# Patient Record
Sex: FEMALE | Race: WHITE | Employment: UNEMPLOYED | URBAN - METROPOLITAN AREA
[De-identification: names, ages, dates, MRNs, and addresses within clinical notes are randomized per-mention and may not be internally consistent; named-entity substitution may affect disease eponyms.]

---
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
## 2018-02-02 PROBLEM — R11.2 NAUSEA AND VOMITING: Status: ACTIVE | Noted: 2018-02-02

## 2018-02-02 PROBLEM — O21.9 NAUSEA AND VOMITING IN PREGNANCY PRIOR TO 22 WEEKS GESTATION: Status: ACTIVE | Noted: 2018-02-02

## 2018-04-18 PROBLEM — O47.9 THREATENED LABOR: Status: ACTIVE | Noted: 2018-04-18

## 2018-06-08 PROBLEM — Z37.9 NORMAL LABOR: Status: ACTIVE | Noted: 2018-06-08

## 2018-06-08 PROBLEM — Z34.93 NORMAL PREGNANCY, THIRD TRIMESTER: Status: ACTIVE | Noted: 2018-06-08

## 2020-09-30 ENCOUNTER — OFFICE VISIT (OUTPATIENT)
Dept: FAMILY MEDICINE CLINIC | Age: 20
End: 2020-09-30
Payer: COMMERCIAL

## 2020-09-30 VITALS
HEART RATE: 85 BPM | OXYGEN SATURATION: 98 % | DIASTOLIC BLOOD PRESSURE: 88 MMHG | BODY MASS INDEX: 29.47 KG/M2 | WEIGHT: 187.8 LBS | SYSTOLIC BLOOD PRESSURE: 126 MMHG | HEIGHT: 67 IN | TEMPERATURE: 97.1 F

## 2020-09-30 PROCEDURE — 99204 OFFICE O/P NEW MOD 45 MIN: CPT | Performed by: NURSE PRACTITIONER

## 2020-09-30 ASSESSMENT — ENCOUNTER SYMPTOMS
VOMITING: 0
COUGH: 0
EYE REDNESS: 0
ABDOMINAL PAIN: 0
STRIDOR: 0
DIARRHEA: 0
VOICE CHANGE: 0
CONSTIPATION: 0
EYE DISCHARGE: 0
NAUSEA: 0
SINUS PRESSURE: 0
BLOOD IN STOOL: 0
PHOTOPHOBIA: 0
SINUS PAIN: 0
EYE ITCHING: 0
TROUBLE SWALLOWING: 0
CHOKING: 0
BACK PAIN: 0
SHORTNESS OF BREATH: 0
EYE PAIN: 0
SORE THROAT: 0
WHEEZING: 0
COLOR CHANGE: 0
RHINORRHEA: 0
CHEST TIGHTNESS: 0

## 2020-09-30 ASSESSMENT — PATIENT HEALTH QUESTIONNAIRE - PHQ9
SUM OF ALL RESPONSES TO PHQ9 QUESTIONS 1 & 2: 1
2. FEELING DOWN, DEPRESSED OR HOPELESS: 1
1. LITTLE INTEREST OR PLEASURE IN DOING THINGS: 0
SUM OF ALL RESPONSES TO PHQ QUESTIONS 1-9: 1
SUM OF ALL RESPONSES TO PHQ QUESTIONS 1-9: 1

## 2020-09-30 NOTE — PROGRESS NOTES
stool, constipation, diarrhea, nausea and vomiting. Endocrine: Negative for cold intolerance, heat intolerance, polydipsia and polyuria. Genitourinary: Negative for difficulty urinating, dysuria, enuresis, flank pain, frequency, hematuria and urgency. Musculoskeletal: Negative for back pain, gait problem, joint swelling, neck pain and neck stiffness. Skin: Negative for color change, pallor, rash and wound. Allergic/Immunologic: Negative for environmental allergies and food allergies. Neurological: Positive for headaches. Negative for dizziness, tremors, syncope, speech difficulty, weakness, light-headedness and numbness. Hematological: Negative for adenopathy. Does not bruise/bleed easily. Psychiatric/Behavioral: Negative for agitation, behavioral problems, confusion, decreased concentration, dysphoric mood, hallucinations, self-injury, sleep disturbance and suicidal ideas. The patient is nervous/anxious. The patient is not hyperactive. Prior to Visit Medications    Not on File        No Known Allergies    Past Medical History:   Diagnosis Date    Anxiety 6/15/2017    Tonsillitis     as child    Trauma     dog bite at age 13       History reviewed. No pertinent surgical history.     Social History     Socioeconomic History    Marital status:      Spouse name: Not on file    Number of children: Not on file    Years of education: Not on file    Highest education level: Not on file   Occupational History    Not on file   Social Needs    Financial resource strain: Not on file    Food insecurity     Worry: Not on file     Inability: Not on file    Transportation needs     Medical: Not on file     Non-medical: Not on file   Tobacco Use    Smoking status: Never Smoker    Smokeless tobacco: Never Used   Substance and Sexual Activity    Alcohol use: No     Alcohol/week: 0.0 standard drinks    Drug use: No    Sexual activity: Yes     Partners: Male   Lifestyle    Physical activity oropharyngeal exudate. Eyes:      General:         Right eye: No discharge. Left eye: No discharge. Conjunctiva/sclera: Conjunctivae normal.      Pupils: Pupils are equal, round, and reactive to light. Neck:      Musculoskeletal: Normal range of motion. Thyroid: No thyromegaly. Vascular: No JVD. Trachea: No tracheal deviation. Cardiovascular:      Rate and Rhythm: Normal rate and regular rhythm. Heart sounds: Normal heart sounds. No murmur. No friction rub. Pulmonary:      Effort: Pulmonary effort is normal. No respiratory distress. Breath sounds: Normal breath sounds. No stridor. No decreased breath sounds, wheezing, rhonchi or rales. Musculoskeletal: Normal range of motion. General: No tenderness. Lymphadenopathy:      Cervical: No cervical adenopathy. Skin:     General: Skin is warm and dry. Capillary Refill: Capillary refill takes less than 2 seconds. Findings: No rash. Neurological:      Mental Status: She is alert and oriented to person, place, and time. Sensory: Sensation is intact. Motor: Motor function is intact. Coordination: Coordination normal.   Psychiatric:         Attention and Perception: Attention and perception normal.         Mood and Affect: Mood normal.         Speech: Speech normal.         Behavior: Behavior normal. Behavior is cooperative. Thought Content: Thought content normal.         Cognition and Memory: Cognition normal.         Judgment: Judgment normal.         ASSESSMENT/PLAN:  1. Establishing care with new doctor, encounter for  -Will review OB labs    2. Anxiety  -Stable at this time    3. Acute nonintractable headache, unspecified headache type  -Will call if this occurs again and will consider ref to neurology. Follow up if symptoms do not improve or worsen. If the patient becomes short of breath go straight to the ER or call 911.       Return in about 1 year (around 9/30/2021). An  electronic signature was used to authenticate this note.     --Christopher Alvarenga, APRN - CNP on 9/30/2020 at 10:47 AM

## 2022-10-04 ENCOUNTER — OFFICE VISIT (OUTPATIENT)
Dept: FAMILY MEDICINE CLINIC | Age: 22
End: 2022-10-04
Payer: COMMERCIAL

## 2022-10-04 VITALS
WEIGHT: 177.8 LBS | SYSTOLIC BLOOD PRESSURE: 122 MMHG | BODY MASS INDEX: 27.91 KG/M2 | OXYGEN SATURATION: 98 % | DIASTOLIC BLOOD PRESSURE: 94 MMHG | HEART RATE: 94 BPM | HEIGHT: 67 IN

## 2022-10-04 DIAGNOSIS — J03.90 TONSILLITIS: Primary | ICD-10-CM

## 2022-10-04 PROCEDURE — 99213 OFFICE O/P EST LOW 20 MIN: CPT | Performed by: FAMILY MEDICINE

## 2022-10-04 RX ORDER — FLUTICASONE PROPIONATE 50 MCG
2 SPRAY, SUSPENSION (ML) NASAL DAILY
Qty: 16 G | Refills: 3 | Status: SHIPPED | OUTPATIENT
Start: 2022-10-04

## 2022-10-04 RX ORDER — AMOXICILLIN AND CLAVULANATE POTASSIUM 875; 125 MG/1; MG/1
1 TABLET, FILM COATED ORAL 2 TIMES DAILY
Qty: 20 TABLET | Refills: 0 | Status: SHIPPED | OUTPATIENT
Start: 2022-10-04 | End: 2022-10-14

## 2022-10-04 RX ORDER — AMOXICILLIN AND CLAVULANATE POTASSIUM 875; 125 MG/1; MG/1
1 TABLET, FILM COATED ORAL 2 TIMES DAILY
Qty: 10 TABLET | Refills: 0 | Status: SHIPPED | OUTPATIENT
Start: 2022-10-04 | End: 2022-10-04

## 2022-10-04 RX ORDER — PREDNISONE 20 MG/1
40 TABLET ORAL DAILY
Qty: 10 TABLET | Refills: 0 | Status: SHIPPED | OUTPATIENT
Start: 2022-10-04 | End: 2022-10-09

## 2022-10-04 ASSESSMENT — ENCOUNTER SYMPTOMS
COUGH: 1
SORE THROAT: 1
RHINORRHEA: 1

## 2022-10-04 ASSESSMENT — PATIENT HEALTH QUESTIONNAIRE - PHQ9
9. THOUGHTS THAT YOU WOULD BE BETTER OFF DEAD, OR OF HURTING YOURSELF: 0
4. FEELING TIRED OR HAVING LITTLE ENERGY: 0
5. POOR APPETITE OR OVEREATING: 0
SUM OF ALL RESPONSES TO PHQ QUESTIONS 1-9: 0
10. IF YOU CHECKED OFF ANY PROBLEMS, HOW DIFFICULT HAVE THESE PROBLEMS MADE IT FOR YOU TO DO YOUR WORK, TAKE CARE OF THINGS AT HOME, OR GET ALONG WITH OTHER PEOPLE: 0
8. MOVING OR SPEAKING SO SLOWLY THAT OTHER PEOPLE COULD HAVE NOTICED. OR THE OPPOSITE, BEING SO FIGETY OR RESTLESS THAT YOU HAVE BEEN MOVING AROUND A LOT MORE THAN USUAL: 0
2. FEELING DOWN, DEPRESSED OR HOPELESS: 0
6. FEELING BAD ABOUT YOURSELF - OR THAT YOU ARE A FAILURE OR HAVE LET YOURSELF OR YOUR FAMILY DOWN: 0
SUM OF ALL RESPONSES TO PHQ QUESTIONS 1-9: 0
3. TROUBLE FALLING OR STAYING ASLEEP: 0
SUM OF ALL RESPONSES TO PHQ9 QUESTIONS 1 & 2: 0
7. TROUBLE CONCENTRATING ON THINGS, SUCH AS READING THE NEWSPAPER OR WATCHING TELEVISION: 0
SUM OF ALL RESPONSES TO PHQ QUESTIONS 1-9: 0
1. LITTLE INTEREST OR PLEASURE IN DOING THINGS: 0
SUM OF ALL RESPONSES TO PHQ QUESTIONS 1-9: 0

## 2022-10-04 NOTE — PROGRESS NOTES
Chief Complaint   Patient presents with    Nasal Congestion    Pharyngitis       HPI:  Rohan Gonzalez is a 23 y.o. (: 2000) here today   for nasal congestion and sore throat. URI   This is a new problem. The current episode started in the past 7 days (6 days or so). The problem has been gradually improving. There has been no fever. Associated symptoms include congestion (nasal), coughing (dry), rhinorrhea and a sore throat. She has tried antihistamine and NSAIDs for the symptoms. The treatment provided moderate (has helped headache.) relief. Seems worse w/ colder weather. Had covid in July. Runny nose since covid. Has taken otc allergy meds. Daughter w/ recent uri sxs. Can feel some mucus in throat. Patient's medications, allergies, past medical, surgical, social and family histories were reviewed and updated as appropriate. ROS:  Review of Systems   Constitutional:  Negative for fever. HENT:  Positive for congestion (nasal), rhinorrhea and sore throat. Respiratory:  Positive for cough (dry). Prior to Visit Medications    Medication Sig Taking? Authorizing Provider   predniSONE (DELTASONE) 20 MG tablet Take 2 tablets by mouth daily for 5 days Yes Chilo Chapman MD   fluticasone Sandra Ramón) 50 MCG/ACT nasal spray 2 sprays by Nasal route daily Yes Chilo Chapman MD   amoxicillin-clavulanate (AUGMENTIN) 875-125 MG per tablet Take 1 tablet by mouth 2 times daily for 10 days Yes Chilo Chapman MD       No Known Allergies    OBJECTIVE:    BP (!) 122/94   Pulse 94   Ht 5' 7\" (1.702 m)   Wt 177 lb 12.8 oz (80.6 kg)   SpO2 98%   BMI 27.85 kg/m²     BP Readings from Last 2 Encounters:   10/04/22 (!) 122/94   20 126/88       Wt Readings from Last 3 Encounters:   10/04/22 177 lb 12.8 oz (80.6 kg)   20 187 lb 12.8 oz (85.2 kg)   18 170 lb (77.1 kg) (92 %, Z= 1.38)*     * Growth percentiles are based on CDC (Girls, 2-20 Years) data.        Physical Exam  Constitutional:       Appearance: She is well-developed. HENT:      Head: Normocephalic and atraumatic. Right Ear: Tympanic membrane and external ear normal.      Left Ear: Tympanic membrane and external ear normal.      Nose:      Right Sinus: No maxillary sinus tenderness or frontal sinus tenderness. Left Sinus: No maxillary sinus tenderness or frontal sinus tenderness. Mouth/Throat: Tonsils: Tonsillar exudate present. 3+ on the right. 2+ on the left. Eyes:      Conjunctiva/sclera: Conjunctivae normal.   Neck:      Trachea: No tracheal deviation. Cardiovascular:      Rate and Rhythm: Normal rate and regular rhythm. Heart sounds: Normal heart sounds. Pulmonary:      Effort: Pulmonary effort is normal.      Breath sounds: Normal breath sounds. Abdominal:      Palpations: Abdomen is soft. Tenderness: There is no abdominal tenderness. Skin:     General: Skin is warm and dry. Neurological:      Mental Status: She is alert and oriented to person, place, and time. Psychiatric:         Mood and Affect: Mood normal.         Behavior: Behavior normal.         ASSESSMENT/PLAN:     1. Tonsillitis  Sig swelling and exudate to bilat tonsils. Meds as below. If fails to resolve, rec ent referral given appearance. Hx of recurrent tonsillitis. Also discussed flonase and humidifier.    - predniSONE (DELTASONE) 20 MG tablet; Take 2 tablets by mouth daily for 5 days  Dispense: 10 tablet; Refill: 0  - fluticasone (FLONASE) 50 MCG/ACT nasal spray; 2 sprays by Nasal route daily  Dispense: 16 g; Refill: 3  - amoxicillin-clavulanate (AUGMENTIN) 875-125 MG per tablet; Take 1 tablet by mouth 2 times daily for 10 days  Dispense: 20 tablet;  Refill: 0

## 2022-12-25 ENCOUNTER — HOSPITAL ENCOUNTER (EMERGENCY)
Age: 22
Discharge: HOME | End: 2022-12-25
Attending: EMERGENCY MEDICINE
Payer: COMMERCIAL

## 2022-12-25 VITALS
TEMPERATURE: 97.6 F | SYSTOLIC BLOOD PRESSURE: 130 MMHG | RESPIRATION RATE: 16 BRPM | HEIGHT: 67 IN | OXYGEN SATURATION: 100 % | DIASTOLIC BLOOD PRESSURE: 96 MMHG | WEIGHT: 180 LBS | HEART RATE: 71 BPM | BODY MASS INDEX: 28.25 KG/M2

## 2022-12-25 DIAGNOSIS — R82.90 ABNORMAL URINALYSIS: ICD-10-CM

## 2022-12-25 DIAGNOSIS — R55 PRE-SYNCOPE: Primary | ICD-10-CM

## 2022-12-25 LAB
ANION GAP SERPL CALCULATED.3IONS-SCNC: 12 MMOL/L (ref 3–16)
BACTERIA: ABNORMAL /HPF
BASOPHILS ABSOLUTE: 0.1 K/UL (ref 0–0.2)
BASOPHILS RELATIVE PERCENT: 0.4 %
BILIRUBIN URINE: NEGATIVE
BLOOD, URINE: ABNORMAL
BUN BLDV-MCNC: 12 MG/DL (ref 7–20)
CALCIUM SERPL-MCNC: 9.6 MG/DL (ref 8.3–10.6)
CHLORIDE BLD-SCNC: 99 MMOL/L (ref 99–110)
CLARITY: ABNORMAL
CO2: 25 MMOL/L (ref 21–32)
COLOR: ABNORMAL
CREAT SERPL-MCNC: 0.8 MG/DL (ref 0.6–1.1)
EOSINOPHILS ABSOLUTE: 0.1 K/UL (ref 0–0.6)
EOSINOPHILS RELATIVE PERCENT: 0.6 %
EPITHELIAL CELLS, UA: ABNORMAL /HPF (ref 0–5)
GFR SERPL CREATININE-BSD FRML MDRD: >60 ML/MIN/{1.73_M2}
GLUCOSE BLD-MCNC: 109 MG/DL (ref 70–99)
GLUCOSE BLD-MCNC: 116 MG/DL (ref 70–99)
GLUCOSE URINE: NEGATIVE MG/DL
HCG(URINE) PREGNANCY TEST: NEGATIVE
HCT VFR BLD CALC: 41 % (ref 36–48)
HEMOGLOBIN: 13.8 G/DL (ref 12–16)
KETONES, URINE: NEGATIVE MG/DL
LEUKOCYTE ESTERASE, URINE: ABNORMAL
LYMPHOCYTES ABSOLUTE: 2 K/UL (ref 1–5.1)
LYMPHOCYTES RELATIVE PERCENT: 14.8 %
MCH RBC QN AUTO: 27.1 PG (ref 26–34)
MCHC RBC AUTO-ENTMCNC: 33.6 G/DL (ref 31–36)
MCV RBC AUTO: 80.7 FL (ref 80–100)
MICROSCOPIC EXAMINATION: YES
MONOCYTES ABSOLUTE: 1 K/UL (ref 0–1.3)
MONOCYTES RELATIVE PERCENT: 7.2 %
NEUTROPHILS ABSOLUTE: 10.4 K/UL (ref 1.7–7.7)
NEUTROPHILS RELATIVE PERCENT: 77 %
NITRITE, URINE: NEGATIVE
PDW BLD-RTO: 13.6 % (ref 12.4–15.4)
PERFORMED ON: ABNORMAL
PH UA: 6.5 (ref 5–8)
PLATELET # BLD: 363 K/UL (ref 135–450)
PMV BLD AUTO: 7.7 FL (ref 5–10.5)
POTASSIUM SERPL-SCNC: 3.4 MMOL/L (ref 3.5–5.1)
PROTEIN UA: NEGATIVE MG/DL
RBC # BLD: 5.08 M/UL (ref 4–5.2)
RBC UA: ABNORMAL /HPF (ref 0–4)
SODIUM BLD-SCNC: 136 MMOL/L (ref 136–145)
SPECIFIC GRAVITY UA: 1.01 (ref 1–1.03)
URINE REFLEX TO CULTURE: YES
URINE TYPE: ABNORMAL
UROBILINOGEN, URINE: 0.2 E.U./DL
WBC # BLD: 13.5 K/UL (ref 4–11)
WBC UA: ABNORMAL /HPF (ref 0–5)

## 2022-12-25 PROCEDURE — 93005 ELECTROCARDIOGRAM TRACING: CPT | Performed by: EMERGENCY MEDICINE

## 2022-12-25 PROCEDURE — 99284 EMERGENCY DEPT VISIT MOD MDM: CPT

## 2022-12-25 PROCEDURE — 81001 URINALYSIS AUTO W/SCOPE: CPT

## 2022-12-25 PROCEDURE — 87086 URINE CULTURE/COLONY COUNT: CPT

## 2022-12-25 PROCEDURE — 80048 BASIC METABOLIC PNL TOTAL CA: CPT

## 2022-12-25 PROCEDURE — 85025 COMPLETE CBC W/AUTO DIFF WBC: CPT

## 2022-12-25 PROCEDURE — 84703 CHORIONIC GONADOTROPIN ASSAY: CPT

## 2022-12-25 RX ORDER — CEFUROXIME AXETIL 250 MG/1
250 TABLET ORAL 2 TIMES DAILY
Qty: 14 TABLET | Refills: 0 | Status: SHIPPED | OUTPATIENT
Start: 2022-12-25 | End: 2023-01-01

## 2022-12-25 RX ORDER — CARBAMAZEPINE 100 MG/5ML
8 SUSPENSION ORAL ONCE
Status: DISCONTINUED | OUTPATIENT
Start: 2022-12-25 | End: 2022-12-25

## 2022-12-25 RX ORDER — NITROFURANTOIN 25; 75 MG/1; MG/1
100 CAPSULE ORAL 2 TIMES DAILY
Qty: 10 CAPSULE | Refills: 0 | Status: SHIPPED
Start: 2022-12-25 | End: 2022-12-25 | Stop reason: HOSPADM

## 2022-12-25 ASSESSMENT — PAIN - FUNCTIONAL ASSESSMENT: PAIN_FUNCTIONAL_ASSESSMENT: NONE - DENIES PAIN

## 2022-12-25 NOTE — ED PROVIDER NOTES
[address] [facility]  ED  eMERGENCY dEPARTMENT eNCOUnter      Pt Name: Cain Reyez  MRN: [id number]  Armsyggfrina 2000  Date of evaluation: 12/25/2022  Provider: Roxie Morejon MD    CHIEF COMPLAINT       Chief Complaint   Patient presents with    Dizziness    Fatigue     Sx started 1230         HISTORY OF PRESENT ILLNESS   (Location/Symptom, Timing/Onset,Context/Setting, Quality, Duration, Modifying Factors, Severity)  Note limiting factors. Cani Reyez is a 23 y.o. female who presents to the emergency department complaining of some presyncopal symptoms. Patient states she came to work today and sometime around noon she started feeling a little lightheaded. She states she had some associated tingling in both of her fingertips. She denied any headache or confusion. No loss of bowel or bladder tone. No lapses in memory. No palpitations. No weakness of her extremities. No seizure activity. No altered mental status. Her coworkers asked her if she was okay and she told them that she was not feeling well and they brought her to the emergency department for further evaluation. I spoke to one of the nurses who brought the patient downstairs and they deny any altered mental status and the patient. HPI    NursingNotes were reviewed. REVIEW OF SYSTEMS    (2-9 systems for level 4, 10 or more for level 5)     Review of Systems    Except as noted above the remainder of the review of systems was reviewed and negative. PAST MEDICAL HISTORY     Past Medical History:   Diagnosis Date    Anxiety 6/15/2017    Tonsillitis     as child    Trauma     dog bite at age 13         SURGICALHISTORY     History reviewed. No pertinent surgical history. CURRENT MEDICATIONS       Previous Medications    FLUTICASONE (FLONASE) 50 MCG/ACT NASAL SPRAY    2 sprays by Nasal route daily       ALLERGIES     Patient has no known allergies.     FAMILY HISTORY       Family History   Problem Relation Age of Onset    Asthma Mother     Thyroid Disease Mother     High Blood Pressure Father     High Cholesterol Father     Breast Cancer Maternal Grandmother     High Blood Pressure Maternal Grandfather     High Blood Pressure Paternal Grandfather     Diabetes Paternal Grandfather     Breast Cancer Paternal Grandmother           SOCIAL HISTORY       Social History     Socioeconomic History    Marital status: Legally      Spouse name: None    Number of children: None    Years of education: None    Highest education level: None   Tobacco Use    Smoking status: Never    Smokeless tobacco: Never   Vaping Use    Vaping Use: Never used   Substance and Sexual Activity    Alcohol use: Yes     Comment: social    Drug use: No    Sexual activity: Yes     Partners: Male       SCREENINGS             PHYSICAL EXAM    (up to 7 for level 4, 8 or more for level 5)     ED Triage Vitals   BP Temp Temp src Pulse Resp SpO2 Height Weight   -- -- -- -- -- -- -- --       Physical Exam    DIAGNOSTIC RESULTS     EKG: All EKG's are interpreted by the Emergency Department Physician who either signs or Co-signsthis chart in the absence of a cardiologist.    The Ekg interpreted by me shows  normal sinus rhythm with a rate of 90  Axis is   Normal  QTc is  normal  Intervals and Durations are unremarkable.       ST Segments: nonspecific changes  No prior EKG available for comparison        RADIOLOGY:   Non-plain filmimages such as CT, Ultrasound and MRI are read by the radiologist. Plain radiographic images are visualized and preliminarily interpreted by the emergency physician with the below findings:        Interpretation per the Radiologist below, if available at the time ofthis note:    No orders to display         ED BEDSIDE ULTRASOUND:   Performed by ED Physician - none    LABS:  Labs Reviewed   CBC WITH AUTO DIFFERENTIAL - Abnormal; Notable for the following components:       Result Value    WBC 13.5 (*)     Neutrophils Absolute 10.4 (*) All other components within normal limits   BASIC METABOLIC PANEL - Abnormal; Notable for the following components:    Potassium 3.4 (*)     Glucose 109 (*)     All other components within normal limits   URINALYSIS WITH REFLEX TO CULTURE - Abnormal; Notable for the following components:    Clarity, UA CLOUDY (*)     Blood, Urine LARGE (*)     Leukocyte Esterase, Urine MODERATE (*)     All other components within normal limits   MICROSCOPIC URINALYSIS - Abnormal; Notable for the following components:    WBC, UA 21-50 (*)     RBC, UA 5-10 (*)     Epithelial Cells, UA 21-50 (*)     Bacteria, UA 3+ (*)     All other components within normal limits   POCT GLUCOSE - Abnormal; Notable for the following components:    POC Glucose 116 (*)     All other components within normal limits   CULTURE, URINE   PREGNANCY, URINE   PROLACTIN       All other labs were within normal range or not returned as of this dictation. EMERGENCY DEPARTMENT COURSE and DIFFERENTIAL DIAGNOSIS/MDM:   Vitals:    Vitals:    12/25/22 1638 12/25/22 1715   BP: (!) 150/107 (!) 137/106   Pulse: 93    Resp: 18    Temp: 97.6 °F (36.4 °C)    TempSrc: Oral    SpO2: 100% 99%   Weight: 180 lb (81.6 kg)    Height: 5' 7\" (1.702 m)            MDM    Frontal diagnosis includes anemia, dehydration, UTI, pregnancy, other    At this time I have a very low suspicion of seizure for this patient nevertheless I did send a prolactin and if that was to come back abnormal would recommend that she gets referral by neurology. Her work-up today showed a potassium of 3.4 and a mild elevation of her white blood cell count at 13.5. The patient's urinalysis was abnormal with moderate leukocyte Estrace and 21-50 white blood cells. She also though had 21-50 epithelial cells but 3+ bacteriuria. I had a long conversation with the patient. She remains afebrile here and is not tachycardic or hypotensive.   I believe the patient could have an early UTI which might explain why she has not been feeling well it is also possible that she has a contaminated specimen. After talking about the risk and benefits of treating the patient for UTI versus awaiting the culture the patient elects to be treated for UTI. Return precautions were discussed. Is this patient to be included in the SEP-1 Core Measure? No   Exclusion criteria - the patient is NOT to be included for SEP-1 Core Measure due to:  May have criteria for sepsis, but does not meet criteria for severe sepsis or septic shock     CRITICAL CARE TIME   I personally saw the patient and independently provided 0 minutes of non-concurrent critical care out of the total shared critical care time provided. There was a high probability of clinically significant/life threatening deterioration in the patient's condition which required my urgent intervention. CONSULTS:  None    PROCEDURES:  Unless otherwise noted below, none     Procedures    FINAL IMPRESSION      1. Pre-syncope    2.  Abnormal urinalysis          DISPOSITION/PLAN   DISPOSITION Decision To Discharge 12/25/2022 07:18:48 PM      PATIENT REFERRED TO:  Jess Cummins MD  [id number] [facility] [address] [facility]  [phone]    Schedule an appointment as soon as possible for a visit in 3 days      DISCHARGE MEDICATIONS:  New Prescriptions    CEFUROXIME (CEFTIN) 250 MG TABLET    Take 1 tablet by mouth 2 times daily for 7 days          (Please note that portions of this note were completed with a voice recognition program.Efforts were made to edit the dictations but occasionally words are mis-transcribed.)    Khadra Wheeler MD (electronically signed)  Attending Emergency Physician          Khadra Wheeler MD  12/25/22 9224

## 2022-12-25 NOTE — ED NOTES
No procal result in chart. RN notified lab, who states they will run now.       Gabby Valentine RN  12/25/22 1916

## 2022-12-25 NOTE — ED NOTES
Pt ambulated with steady gait to bathroom. Urine sample collected and sent to lab.       Deacon Guadalupe RN  12/25/22 3908

## 2022-12-26 LAB
EKG ATRIAL RATE: 90 BPM
EKG DIAGNOSIS: NORMAL
EKG P AXIS: 51 DEGREES
EKG P-R INTERVAL: 128 MS
EKG Q-T INTERVAL: 358 MS
EKG QRS DURATION: 88 MS
EKG QTC CALCULATION (BAZETT): 437 MS
EKG R AXIS: 40 DEGREES
EKG T AXIS: 33 DEGREES
EKG VENTRICULAR RATE: 90 BPM
REASON FOR REJECTION: NORMAL
REJECTED TEST: NORMAL

## 2022-12-26 PROCEDURE — 93010 ELECTROCARDIOGRAM REPORT: CPT | Performed by: INTERNAL MEDICINE

## 2022-12-27 LAB — URINE CULTURE, ROUTINE: NORMAL

## 2023-01-09 ENCOUNTER — TELEMEDICINE (OUTPATIENT)
Dept: FAMILY MEDICINE CLINIC | Age: 23
End: 2023-01-09
Payer: COMMERCIAL

## 2023-01-09 DIAGNOSIS — R53.83 OTHER FATIGUE: Primary | ICD-10-CM

## 2023-01-09 DIAGNOSIS — R03.0 ELEVATED BLOOD PRESSURE READING: ICD-10-CM

## 2023-01-09 DIAGNOSIS — Z13.220 SCREENING, LIPID: ICD-10-CM

## 2023-01-09 DIAGNOSIS — F41.9 ANXIETY: ICD-10-CM

## 2023-01-09 PROBLEM — Z34.93 NORMAL PREGNANCY, THIRD TRIMESTER: Status: RESOLVED | Noted: 2018-06-08 | Resolved: 2023-01-09

## 2023-01-09 PROCEDURE — 99213 OFFICE O/P EST LOW 20 MIN: CPT | Performed by: FAMILY MEDICINE

## 2023-01-09 RX ORDER — CEFUROXIME AXETIL 250 MG/1
250 TABLET ORAL 2 TIMES DAILY
COMMUNITY

## 2023-01-09 ASSESSMENT — PATIENT HEALTH QUESTIONNAIRE - PHQ9
9. THOUGHTS THAT YOU WOULD BE BETTER OFF DEAD, OR OF HURTING YOURSELF: 0
8. MOVING OR SPEAKING SO SLOWLY THAT OTHER PEOPLE COULD HAVE NOTICED. OR THE OPPOSITE, BEING SO FIGETY OR RESTLESS THAT YOU HAVE BEEN MOVING AROUND A LOT MORE THAN USUAL: 0
SUM OF ALL RESPONSES TO PHQ QUESTIONS 1-9: 0
5. POOR APPETITE OR OVEREATING: 0
SUM OF ALL RESPONSES TO PHQ9 QUESTIONS 1 & 2: 0
SUM OF ALL RESPONSES TO PHQ QUESTIONS 1-9: 0
SUM OF ALL RESPONSES TO PHQ QUESTIONS 1-9: 0
7. TROUBLE CONCENTRATING ON THINGS, SUCH AS READING THE NEWSPAPER OR WATCHING TELEVISION: 0
2. FEELING DOWN, DEPRESSED OR HOPELESS: 0
SUM OF ALL RESPONSES TO PHQ QUESTIONS 1-9: 0
6. FEELING BAD ABOUT YOURSELF - OR THAT YOU ARE A FAILURE OR HAVE LET YOURSELF OR YOUR FAMILY DOWN: 0
10. IF YOU CHECKED OFF ANY PROBLEMS, HOW DIFFICULT HAVE THESE PROBLEMS MADE IT FOR YOU TO DO YOUR WORK, TAKE CARE OF THINGS AT HOME, OR GET ALONG WITH OTHER PEOPLE: 0
4. FEELING TIRED OR HAVING LITTLE ENERGY: 0
1. LITTLE INTEREST OR PLEASURE IN DOING THINGS: 0
3. TROUBLE FALLING OR STAYING ASLEEP: 0

## 2023-01-09 ASSESSMENT — ENCOUNTER SYMPTOMS: SHORTNESS OF BREATH: 0

## 2023-01-09 NOTE — PROGRESS NOTES
2023    TELEHEALTH EVALUATION -- Audio/Visual (During HSYIC-66 public health emergency)    HPI:    Clemencia Young (:  2000) has requested an audio/video evaluation for the following concern(s):    Was on ER on ana. Was at work. Was feeling \"off. \"  Was lightheaded. Bp was elevated. Checked by nurse at work. Taken to ER. ER concerned re anxiety. Did not really feel anxiety related. Felt \"fuzziness\" and lightheadedness at that time. Abn ua. Placed on abx. Overall feeling better. Still sig fatigue. Has not checked bp recently. No further lightheadedness. Review of Systems   Constitutional:  Positive for fatigue. Negative for fever. Respiratory:  Negative for shortness of breath. Neurological:  Negative for dizziness and light-headedness. Prior to Visit Medications    Medication Sig Taking?  Authorizing Provider   cefUROXime (CEFTIN) 250 MG tablet Take 250 mg by mouth 2 times daily Yes Historical Provider, MD       Social History     Tobacco Use    Smoking status: Never    Smokeless tobacco: Never   Vaping Use    Vaping Use: Never used   Substance Use Topics    Alcohol use: Yes     Comment: social    Drug use: No        No Known Allergies,   Past Medical History:   Diagnosis Date    Anxiety 6/15/2017    Tonsillitis     as child    Trauma     dog bite at age 13       PHYSICAL EXAMINATION:  [ INSTRUCTIONS:  \"[x]\" Indicates a positive item  \"[]\" Indicates a negative item  -- DELETE ALL ITEMS NOT EXAMINED]  Vital Signs: (As obtained by patient/caregiver or practitioner observation)    Blood pressure-  Heart rate-    Respiratory rate-    Temperature-  Pulse oximetry-     Constitutional: [x] Appears well-developed and well-nourished [] No apparent distress      [] Abnormal-   Mental status  [x] Alert and awake  [x] Oriented to person/place/time []Able to follow commands      Eyes:  EOM    [x]  Normal  [] Abnormal-  Sclera  []  Normal  [] Abnormal -         Discharge []  None visible  [] Abnormal -    HENT:   [x] Normocephalic, atraumatic. [] Abnormal   [] Mouth/Throat: Mucous membranes are moist.     External Ears [] Normal  [] Abnormal-     Neck: [] No visualized mass     Pulmonary/Chest: [x] Respiratory effort normal.  [] No visualized signs of difficulty breathing or respiratory distress        [] Abnormal-      Musculoskeletal:   [] Normal gait with no signs of ataxia         [] Normal range of motion of neck        [] Abnormal-       Neurological:        [x] No Facial Asymmetry (Cranial nerve 7 motor function) (limited exam to video visit)          [] No gaze palsy        [] Abnormal-         Skin:        [x] No significant exanthematous lesions or discoloration noted on facial skin         [] Abnormal-            Psychiatric:       [x] Normal Affect [] No Hallucinations        [] Abnormal-     Other pertinent observable physical exam findings-     Due to this being a TeleHealth encounter, evaluation of the following organ systems is limited: Vitals/Constitutional/EENT/Resp/CV/GI//MS/Neuro/Skin/Heme-Lymph-Imm. ASSESSMENT/PLAN:  1. Other fatigue  Ongoing issues with fatigue. Differential diagnosis is large, but significant family history of thyroid issues. She does have a -aged child which may be contributing as well. She denies significant issues with anxiety at this time, but acknowledges that may be contributing at least somewhat as well. Labs as below. Call if fails to improve. Lightheadedness is improved. I reviewed her ER note as well. Treated with antibiotics for possible urinary tract infection  - Comprehensive Metabolic Panel; Future  - TSH; Future  - T4, Free; Future  - CBC with Auto Differential; Future  - Vitamin B12; Future    2. Anxiety  Concern that there may have been an anxiety component to her recent ER visit. Patient denies significant anxiety currently. Monitor for now.     3. Elevated blood pressure reading  Blood pressure elevated in emergency department. She has not really had any repeat blood pressure checks since. Diastolic blood pressure mildly elevated last time she was here. Repeat blood pressure at work and at home. Call if remains elevated    4. Screening, lipid  Labs as below with next draw  - Lipid Panel; Future    No follow-ups on file. An  electronic signature was used to authenticate this note. --Tayo Whyte MD on 1/9/2023 at 1:02 PM    This document was prepared by a combination of typing and transcription through a voice recognition software. Pursuant to the emergency declaration under the [facility] waiver authority and the [employer] and [employer] Act, this Virtual  Visit was conducted, with patient's consent, to reduce the patient's risk of exposure to COVID-19 and provide continuity of care for an established patient. Services were provided through a video synchronous discussion virtually to substitute for in-person clinic visit.

## 2023-01-14 ASSESSMENT — ENCOUNTER SYMPTOMS
SHORTNESS OF BREATH: 0
ABDOMINAL PAIN: 0
EYE REDNESS: 0
RHINORRHEA: 0
SORE THROAT: 0

## 2023-02-02 ENCOUNTER — HOSPITAL ENCOUNTER (OUTPATIENT)
Age: 23
Discharge: HOME | End: 2023-02-02
Payer: COMMERCIAL

## 2023-02-02 DIAGNOSIS — R53.83 OTHER FATIGUE: ICD-10-CM

## 2023-02-02 DIAGNOSIS — Z13.220 SCREENING, LIPID: ICD-10-CM

## 2023-02-02 LAB
A/G RATIO: 1.4 (ref 1.1–2.2)
ALBUMIN SERPL-MCNC: 4.6 G/DL (ref 3.4–5)
ALP BLD-CCNC: 75 U/L (ref 40–129)
ALT SERPL-CCNC: 7 U/L (ref 10–40)
ANION GAP SERPL CALCULATED.3IONS-SCNC: 10 MMOL/L (ref 3–16)
AST SERPL-CCNC: 15 U/L (ref 15–37)
BASOPHILS ABSOLUTE: 0 K/UL (ref 0–0.2)
BASOPHILS RELATIVE PERCENT: 0.7 %
BILIRUB SERPL-MCNC: 0.5 MG/DL (ref 0–1)
BUN BLDV-MCNC: 15 MG/DL (ref 7–20)
CALCIUM SERPL-MCNC: 9.9 MG/DL (ref 8.3–10.6)
CHLORIDE BLD-SCNC: 102 MMOL/L (ref 99–110)
CHOLESTEROL, TOTAL: 183 MG/DL (ref 0–199)
CO2: 28 MMOL/L (ref 21–32)
CREAT SERPL-MCNC: 0.8 MG/DL (ref 0.6–1.1)
EOSINOPHILS ABSOLUTE: 0.1 K/UL (ref 0–0.6)
EOSINOPHILS RELATIVE PERCENT: 1.8 %
GFR SERPL CREATININE-BSD FRML MDRD: >60 ML/MIN/{1.73_M2}
GLUCOSE BLD-MCNC: 93 MG/DL (ref 70–99)
HCT VFR BLD CALC: 40.9 % (ref 36–48)
HDLC SERPL-MCNC: 49 MG/DL (ref 40–60)
HEMOGLOBIN: 13.6 G/DL (ref 12–16)
LDL CHOLESTEROL CALCULATED: 112 MG/DL
LYMPHOCYTES ABSOLUTE: 2.5 K/UL (ref 1–5.1)
LYMPHOCYTES RELATIVE PERCENT: 35.5 %
MCH RBC QN AUTO: 26.4 PG (ref 26–34)
MCHC RBC AUTO-ENTMCNC: 33.3 G/DL (ref 31–36)
MCV RBC AUTO: 79.3 FL (ref 80–100)
MONOCYTES ABSOLUTE: 0.6 K/UL (ref 0–1.3)
MONOCYTES RELATIVE PERCENT: 9.3 %
NEUTROPHILS ABSOLUTE: 3.7 K/UL (ref 1.7–7.7)
NEUTROPHILS RELATIVE PERCENT: 52.7 %
PDW BLD-RTO: 14 % (ref 12.4–15.4)
PLATELET # BLD: 303 K/UL (ref 135–450)
PMV BLD AUTO: 8 FL (ref 5–10.5)
POTASSIUM SERPL-SCNC: 4 MMOL/L (ref 3.5–5.1)
RBC # BLD: 5.15 M/UL (ref 4–5.2)
SODIUM BLD-SCNC: 140 MMOL/L (ref 136–145)
T4 FREE: 1.2 NG/DL (ref 0.9–1.8)
TOTAL PROTEIN: 7.9 G/DL (ref 6.4–8.2)
TRIGL SERPL-MCNC: 110 MG/DL (ref 0–150)
TSH SERPL DL<=0.05 MIU/L-ACNC: 1.1 UIU/ML (ref 0.27–4.2)
VITAMIN B-12: 301 PG/ML (ref 211–911)
VLDLC SERPL CALC-MCNC: 22 MG/DL
WBC # BLD: 6.9 K/UL (ref 4–11)

## 2023-02-02 PROCEDURE — 84443 ASSAY THYROID STIM HORMONE: CPT

## 2023-02-02 PROCEDURE — 82607 VITAMIN B-12: CPT

## 2023-02-02 PROCEDURE — 36415 COLL VENOUS BLD VENIPUNCTURE: CPT

## 2023-02-02 PROCEDURE — 80061 LIPID PANEL: CPT

## 2023-02-02 PROCEDURE — 80053 COMPREHEN METABOLIC PANEL: CPT

## 2023-02-02 PROCEDURE — 84439 ASSAY OF FREE THYROXINE: CPT

## 2023-02-02 PROCEDURE — 85025 COMPLETE CBC W/AUTO DIFF WBC: CPT

## 2023-02-02 NOTE — RESULT ENCOUNTER NOTE
Thyroid labs normal.  Does not explain fatigue. Still waiting a B12 level.   If it is normal, and fatigue persists, may need follow-up for further evaluation and to discuss further

## 2023-02-07 ENCOUNTER — PATIENT MESSAGE (OUTPATIENT)
Dept: FAMILY MEDICINE CLINIC | Age: 23
End: 2023-02-07

## 2023-02-07 RX ORDER — LOSARTAN POTASSIUM 25 MG/1
25 TABLET ORAL DAILY
Qty: 30 TABLET | Refills: 1 | Status: SHIPPED | OUTPATIENT
Start: 2023-02-07

## 2023-02-07 NOTE — TELEPHONE ENCOUNTER
Recommend add losartan 25mg po daily. 1 mo w/ 1 rf. In office appt in 2-3 weeks to monitor response.

## 2023-02-07 NOTE — TELEPHONE ENCOUNTER
From: Eliud Davis  To: Dr. Cavanaugh Betters: 2/7/2023 4:28 PM EST  Subject: High BP    Hello, during our virtual visit you requested that I monitor my Blood pressure as its been running high. Ajay [place] been monitoring it as often as possible. Some were also high but I forgot to put in my notes. ( usually at work, no one at home knows how )     Heres what theyve been looking like. 01/11/23   148/118   158/117 @ 2:47PM      01/12/23 @ 2:53   153/107     02/07/23 @4:14pm   136/109       Just wondering, what do we do from here? And could this cause my fatigue?

## 2023-03-09 ENCOUNTER — OFFICE VISIT (OUTPATIENT)
Dept: FAMILY MEDICINE CLINIC | Age: 23
End: 2023-03-09
Payer: COMMERCIAL

## 2023-03-09 VITALS
WEIGHT: 189.4 LBS | BODY MASS INDEX: 29.73 KG/M2 | OXYGEN SATURATION: 98 % | HEIGHT: 67 IN | HEART RATE: 80 BPM | DIASTOLIC BLOOD PRESSURE: 72 MMHG | SYSTOLIC BLOOD PRESSURE: 136 MMHG

## 2023-03-09 DIAGNOSIS — R03.0 ELEVATED BLOOD PRESSURE READING: Primary | ICD-10-CM

## 2023-03-09 DIAGNOSIS — J34.89 SINUS PRESSURE: ICD-10-CM

## 2023-03-09 PROCEDURE — 99213 OFFICE O/P EST LOW 20 MIN: CPT | Performed by: FAMILY MEDICINE

## 2023-03-09 RX ORDER — AZELASTINE 1 MG/ML
2 SPRAY, METERED NASAL 2 TIMES DAILY
Qty: 120 ML | Refills: 1 | Status: SHIPPED | OUTPATIENT
Start: 2023-03-09

## 2023-03-09 RX ORDER — LOSARTAN POTASSIUM 25 MG/1
25 TABLET ORAL DAILY
Qty: 90 TABLET | Refills: 1 | Status: SHIPPED | OUTPATIENT
Start: 2023-03-09

## 2023-03-09 SDOH — ECONOMIC STABILITY: HOUSING INSECURITY
IN THE LAST 12 MONTHS, WAS THERE A TIME WHEN YOU DID NOT HAVE A STEADY PLACE TO SLEEP OR SLEPT IN A SHELTER (INCLUDING NOW)?: NO

## 2023-03-09 SDOH — ECONOMIC STABILITY: FOOD INSECURITY: WITHIN THE PAST 12 MONTHS, THE FOOD YOU BOUGHT JUST DIDN'T LAST AND YOU DIDN'T HAVE MONEY TO GET MORE.: NEVER TRUE

## 2023-03-09 SDOH — ECONOMIC STABILITY: FOOD INSECURITY: WITHIN THE PAST 12 MONTHS, YOU WORRIED THAT YOUR FOOD WOULD RUN OUT BEFORE YOU GOT MONEY TO BUY MORE.: NEVER TRUE

## 2023-03-09 SDOH — ECONOMIC STABILITY: INCOME INSECURITY: HOW HARD IS IT FOR YOU TO PAY FOR THE VERY BASICS LIKE FOOD, HOUSING, MEDICAL CARE, AND HEATING?: NOT HARD AT ALL

## 2023-03-09 ASSESSMENT — PATIENT HEALTH QUESTIONNAIRE - PHQ9
SUM OF ALL RESPONSES TO PHQ QUESTIONS 1-9: 0
SUM OF ALL RESPONSES TO PHQ QUESTIONS 1-9: 0
1. LITTLE INTEREST OR PLEASURE IN DOING THINGS: 0
SUM OF ALL RESPONSES TO PHQ9 QUESTIONS 1 & 2: 0
2. FEELING DOWN, DEPRESSED OR HOPELESS: 0
SUM OF ALL RESPONSES TO PHQ QUESTIONS 1-9: 0
SUM OF ALL RESPONSES TO PHQ QUESTIONS 1-9: 0

## 2023-03-09 ASSESSMENT — ENCOUNTER SYMPTOMS
SINUS PRESSURE: 1
SHORTNESS OF BREATH: 0

## 2023-03-09 NOTE — PROGRESS NOTES
Chief Complaint   Patient presents with    Follow-up       HPI:  Mahendra Estrella is a 23 y.o. (: 2000) here today   for follow up after starting Losartan. Hypertension  The problem has been gradually improving since onset. The problem is controlled. Associated symptoms include headaches. Pertinent negatives include no shortness of breath. Past treatments include angiotensin blockers. The current treatment provides moderate improvement. No other se w/ losartan. Fatigue has improved as well. Drinking more water. Patient's medications, allergies, past medical, surgical, social and family histories were reviewed and updated as appropriate. ROS:  Review of Systems   Constitutional:  Negative for fever. HENT:  Positive for sinus pressure. Respiratory:  Negative for shortness of breath. Neurological:  Positive for headaches.          Microscopic Examination (no units)   Date Value   2022 YES     LDL Calculated (mg/dL)   Date Value   2023 112 (H)       Past Medical History:   Diagnosis Date    Anxiety 6/15/2017    Tonsillitis     as child    Trauma     dog bite at age 13       Family History   Problem Relation Age of Onset    Asthma Mother     Thyroid Disease Mother     High Blood Pressure Father     High Cholesterol Father     Breast Cancer Maternal Grandmother     High Blood Pressure Maternal Grandfather     High Blood Pressure Paternal Grandfather     Diabetes Paternal Grandfather     Breast Cancer Paternal Grandmother        Social History     Socioeconomic History    Marital status: Legally      Spouse name: Not on file    Number of children: Not on file    Years of education: Not on file    Highest education level: Not on file   Occupational History    Not on file   Tobacco Use    Smoking status: Never    Smokeless tobacco: Never   Vaping Use    Vaping Use: Never used   Substance and Sexual Activity    Alcohol use: Yes     Comment: social    Drug use: No    Sexual activity: Yes     Partners: Male   Other Topics Concern    Not on file   Social History Narrative    Not on file     Social Determinants of Health     Financial Resource Strain: Low Risk     Difficulty of Paying Living Expenses: Not hard at all   Food Insecurity: No Food Insecurity    Worried About [address] in the Last Year: Never true    [address] in the Last Year: Never true   Transportation Needs: Unknown    Lack of Transportation (Medical): Not on file    Lack of Transportation (Non-Medical): No   Physical Activity: Not on file   Stress: Not on file   Social Connections: Not on file   Intimate Partner Violence: Not on file   Housing Stability: Unknown    Unable to Pay for Housing in the Last Year: Not on file    Number of Places Lived in the Last Year: Not on file    Unstable Housing in the Last Year: No       Prior to Visit Medications    Medication Sig Taking? Authorizing Provider   losartan (COZAAR) 25 MG tablet Take 1 tablet by mouth daily Yes Anusha Neely MD   azelastine (ASTELIN) 0.1 % nasal spray 2 sprays by Nasal route 2 times daily Use in each nostril as directed Yes Anusha Neely MD       No Known Allergies    OBJECTIVE:    /72   Pulse 80   Ht 5' 7\" (1.702 m)   Wt 189 lb 6.4 oz (85.9 kg)   SpO2 98%   BMI 29.66 kg/m²     BP Readings from Last 2 Encounters:   03/09/23 136/72   12/25/22 (!) 130/96       Wt Readings from Last 3 Encounters:   03/09/23 189 lb 6.4 oz (85.9 kg)   12/25/22 180 lb (81.6 kg)   10/04/22 177 lb 12.8 oz (80.6 kg)       Physical Exam  Constitutional:       Appearance: Normal appearance. HENT:      Head: Normocephalic and atraumatic. Nose:      Right Sinus: Maxillary sinus tenderness present. No frontal sinus tenderness. Left Sinus: Maxillary sinus tenderness present. No frontal sinus tenderness. Eyes:      Extraocular Movements: Extraocular movements intact. Cardiovascular:      Rate and Rhythm: Normal rate and regular rhythm.    Pulmonary: Effort: Pulmonary effort is normal.      Breath sounds: Normal breath sounds. Skin:     General: Skin is warm and dry. Neurological:      General: No focal deficit present. Mental Status: She is alert and oriented to person, place, and time. Psychiatric:         Mood and Affect: Mood normal.         Behavior: Behavior normal.         ASSESSMENT/PLAN:    1. Elevated blood pressure reading  The current medical regimen is effective;  continue present plan and medications. Overall improved. F/u in approx 6 mo. Cont to work on diet and weight loss. - losartan (COZAAR) 25 MG tablet; Take 1 tablet by mouth daily  Dispense: 90 tablet; Refill: 1    2. Sinus pressure  Prn med as below. Has flonase at home.    - azelastine (ASTELIN) 0.1 % nasal spray; 2 sprays by Nasal route 2 times daily Use in each nostril as directed  Dispense: 120 mL; Refill: 1      Overall fatigue improved.   Cont otc b12 supplement

## 2023-03-14 DIAGNOSIS — R03.0 ELEVATED BLOOD PRESSURE READING: ICD-10-CM

## 2023-03-14 RX ORDER — LOSARTAN POTASSIUM 25 MG/1
25 TABLET ORAL DAILY
Qty: 90 TABLET | Refills: 1 | Status: SHIPPED | OUTPATIENT
Start: 2023-03-14

## 2023-03-14 NOTE — TELEPHONE ENCOUNTER
Patient calling and states that she needs her blood pressure medication to go to [facility] when it is due again.